# Patient Record
(demographics unavailable — no encounter records)

---

## 2025-03-31 NOTE — COUNSELING
[FreeTextEntry1] :  Please call the office if you feel like you have an infection so that we can arrange testing of your urine. If you keep getting infections then I will recommend starting a low dose antibiotic every day for 3 months to help prevent UTIs  Please continue to take the D mannose/cranberry supplements daily  Please call your general gynecologist to discuss boric acid treatment after sex (when you get discharge)  Schedule a 6 month med check with either my PA, Meg or my NP, Chata (ARELIS)

## 2025-03-31 NOTE — END OF VISIT
[TextEntry] : 60m E&M, >50% spent in direct face to face counseling/coordination of care recurrent uti. All questions answered. Patient reassured.

## 2025-03-31 NOTE — REASON FOR VISIT
[TextEntry] : Reason for visit: New Patient Voids per day: 4-6   Voids per night: 0-1   Urge incontinence: Rare (+) leakage only with a very full bladder Stress incontinence: No    Constipation: No Fecal incontinence: No    Vaginal bulge: No

## 2025-03-31 NOTE — PHYSICAL EXAM
[Chaperone Present] : A chaperone was present in the examining room during all aspects of the physical examination [FreeTextEntry2] : LUISITO [FreeTextEntry1] : Void:  75cc PVR:  PT REFUSED CATH  no abdominal incisions normal perineal sensation normal perineal reflexes -cough stress test -atrophy -prolapse +fixed urethra +bilateral levator ani spasm,  -tenderness -urethral tenderness -bladder tenderness -cervical tenderness 1/5 Kegel

## 2025-03-31 NOTE — DISCUSSION/SUMMARY
[FreeTextEntry1] :  Recurrent UTI- Discussed with the patient potential management options for her recurrent urinary tract infections including behavioral modifications, cranberry supplementation, estrogen vaginal cream, and antibiotic suppression. I advised the patient that if she start getting UTIs again then I will recommend suppression and evaluation of the bladder with a cysto. The patient voiced understanding and agrees with the plan.

## 2025-03-31 NOTE — HISTORY OF PRESENT ILLNESS
[FreeTextEntry1] : Pt with pelvic floor dysfunction here for urogynecologic evaluation. She describes:   Chief PFD: UTI  UTIs: Symptoms: gross hematuria, dysuria, frequency, hesitancy, not associated with sex 12/3/2024: e coli S augmentin R cipro R levo R bactrim 11/16/2024: klebsiella R augmentin R keflex I macrobid S bactrim Taking D mannose/cranberry daily since with no issue 12/3/2024: ultrasound: no hydro, PVR normal  Pelvic organ prolapse: no bulge, denies splinting Stress urinary incontinence: denies Overactive bladder syndrome: denies Voiding dysfunction: no Incomplete bladder emptying, no hesitancy  Lower urinary tract/vaginal symptoms: as above UTIs per year, no hematuria, no dysuria, no bladder pain  Fecal incontinence: denies Defecatory dysfunction: sausage Sexual dysfunction: denies issues Pelvic pain: denies Vaginal dryness denies  Her pelvic floor symptoms are significantly bothersome and negatively impacting her quality of life.